# Patient Record
Sex: FEMALE | HISPANIC OR LATINO | URBAN - METROPOLITAN AREA
[De-identification: names, ages, dates, MRNs, and addresses within clinical notes are randomized per-mention and may not be internally consistent; named-entity substitution may affect disease eponyms.]

---

## 2018-06-19 ENCOUNTER — OFFICE VISIT (OUTPATIENT)
Dept: URBAN - METROPOLITAN AREA CLINIC 48 | Facility: CLINIC | Age: 58
End: 2018-06-19
Payer: COMMERCIAL

## 2018-06-19 DIAGNOSIS — H40.52X3: Primary | ICD-10-CM

## 2018-06-19 PROCEDURE — 92012 INTRM OPH EXAM EST PATIENT: CPT | Performed by: OPHTHALMOLOGY

## 2018-06-19 RX ORDER — BRIMONIDINE TARTRATE 2 MG/ML
0.2 % SOLUTION/ DROPS OPHTHALMIC
Qty: 1 | Refills: 7 | Status: INACTIVE
Start: 2018-06-19 | End: 2018-11-06

## 2018-06-19 RX ORDER — DORZOLAMIDE HCL 20 MG/ML
2 % SOLUTION/ DROPS OPHTHALMIC
Qty: 1 | Refills: 7 | Status: INACTIVE
Start: 2018-06-19 | End: 2018-11-06

## 2018-06-19 RX ORDER — BIMATOPROST 0.1 MG/ML
0.01 % SOLUTION/ DROPS OPHTHALMIC
Qty: 1 | Refills: 7 | Status: INACTIVE
Start: 2018-06-19 | End: 2018-11-06

## 2018-06-19 ASSESSMENT — INTRAOCULAR PRESSURE
OD: 15
OS: 15

## 2018-06-19 NOTE — IMPRESSION/PLAN
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 Plan: Discussed and reviewed diagnosis with patient today, understood by patient, intraocular pressure stable with medication. Continue medications and observe. Importance of compliance with medications and regular follow-up reiterated, will continue to monitor.  Patient to continue  Lumigan qhs OS, Dorzolamide bid OS , Brimonidine tid OS

## 2019-01-01 ENCOUNTER — OFFICE VISIT (OUTPATIENT)
Dept: URBAN - METROPOLITAN AREA CLINIC 48 | Facility: CLINIC | Age: 59
End: 2019-01-01
Payer: COMMERCIAL

## 2019-01-01 PROCEDURE — 92012 INTRM OPH EXAM EST PATIENT: CPT | Performed by: OPHTHALMOLOGY

## 2019-01-01 PROCEDURE — 92014 COMPRE OPH EXAM EST PT 1/>: CPT | Performed by: OPHTHALMOLOGY

## 2019-01-01 PROCEDURE — 92133 CPTRZD OPH DX IMG PST SGM ON: CPT | Performed by: OPHTHALMOLOGY

## 2019-01-01 RX ORDER — BIMATOPROST 0.1 MG/ML
0.01 % SOLUTION/ DROPS OPHTHALMIC
Qty: 1 | Refills: 7 | Status: INACTIVE
Start: 2019-01-01 | End: 2019-01-01

## 2019-01-01 RX ORDER — BIMATOPROST 0.1 MG/ML
0.01 % SOLUTION/ DROPS OPHTHALMIC
Qty: 1 | Refills: 7 | Status: ACTIVE
Start: 2019-01-01

## 2019-01-01 RX ORDER — BRIMONIDINE TARTRATE 2 MG/ML
0.2 % SOLUTION/ DROPS OPHTHALMIC
Qty: 1 | Refills: 7 | Status: ACTIVE
Start: 2019-01-01

## 2019-01-01 RX ORDER — DORZOLAMIDE HCL 20 MG/ML
2 % SOLUTION/ DROPS OPHTHALMIC
Qty: 1 | Refills: 7 | Status: INACTIVE
Start: 2019-01-01 | End: 2019-01-01

## 2019-01-01 RX ORDER — FUROSEMIDE 80 MG/1
80 MG TABLET ORAL
Qty: 0 | Refills: 0 | Status: ACTIVE
Start: 2019-01-01

## 2019-01-01 ASSESSMENT — INTRAOCULAR PRESSURE
OS: 11
OD: 11
OD: 13
OS: 13
OD: 14
OS: 14
OD: 14
OS: 12

## 2019-01-29 NOTE — IMPRESSION/PLAN
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 Patient may get refraction to od. Plan: Discussed and reviewed diagnosis with patient today, understood by patient, intraocular pressure stable with medication. Continue medications and observe. Importance of compliance with medications and regular follow-up reiterated, will continue to monitor. Patient to continue  Lumigan qhs OS, Dorzolamide bid OS , Brimonidine tid OS.

## 2019-04-16 NOTE — IMPRESSION/PLAN
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 Plan: IOP well controlled, attempt reducing medication load. Stop brimonidine to OS, continue dorzolamide bid OS and lumigan qhs OS. Use AFT frequently, IOP check in 6 weeks.

## 2019-06-03 NOTE — IMPRESSION/PLAN
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 Plan: IOP excellent continue reducing medication burden. Stop dorzolamide to OS. Continue w/ Lumigan qhs os.

## 2019-07-15 NOTE — IMPRESSION/PLAN
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 Plan: Discussed and reviewed diagnosis with patient today, understood by patient, intraocular pressure stable with medication. Continue medications and observe. Importance of compliance with medications and regular follow-up reiterated, will continue to monitor. Patient to continue Lumigan qhs OS.